# Patient Record
Sex: MALE | Race: BLACK OR AFRICAN AMERICAN | Employment: UNEMPLOYED | URBAN - METROPOLITAN AREA
[De-identification: names, ages, dates, MRNs, and addresses within clinical notes are randomized per-mention and may not be internally consistent; named-entity substitution may affect disease eponyms.]

---

## 2021-09-02 ENCOUNTER — HOSPITAL ENCOUNTER (EMERGENCY)
Age: 36
Discharge: HOME | End: 2021-09-02
Attending: STUDENT IN AN ORGANIZED HEALTH CARE EDUCATION/TRAINING PROGRAM
Payer: COMMERCIAL

## 2021-09-02 VITALS
RESPIRATION RATE: 16 BRPM | OXYGEN SATURATION: 97 % | WEIGHT: 315 LBS | HEIGHT: 73 IN | TEMPERATURE: 97.7 F | BODY MASS INDEX: 41.75 KG/M2 | HEART RATE: 97 BPM | SYSTOLIC BLOOD PRESSURE: 143 MMHG | DIASTOLIC BLOOD PRESSURE: 78 MMHG

## 2021-09-02 DIAGNOSIS — R22.0 SWELLING OF UPPER LIP: Primary | ICD-10-CM

## 2021-09-02 DIAGNOSIS — U07.1 COVID-19: ICD-10-CM

## 2021-09-02 PROCEDURE — 74011636637 HC RX REV CODE- 636/637: Performed by: PHYSICIAN ASSISTANT

## 2021-09-02 PROCEDURE — 99282 EMERGENCY DEPT VISIT SF MDM: CPT

## 2021-09-02 PROCEDURE — 74011250637 HC RX REV CODE- 250/637: Performed by: PHYSICIAN ASSISTANT

## 2021-09-02 RX ORDER — PREDNISONE 20 MG/1
60 TABLET ORAL DAILY
Qty: 12 TABLET | Refills: 0 | Status: SHIPPED | OUTPATIENT
Start: 2021-09-03 | End: 2021-09-07

## 2021-09-02 RX ORDER — HYDROXYZINE 25 MG/1
25-50 TABLET, FILM COATED ORAL
Qty: 20 TABLET | Refills: 0 | Status: SHIPPED | OUTPATIENT
Start: 2021-09-02

## 2021-09-02 RX ORDER — FAMOTIDINE 20 MG/1
20 TABLET, FILM COATED ORAL
Status: COMPLETED | OUTPATIENT
Start: 2021-09-02 | End: 2021-09-02

## 2021-09-02 RX ORDER — EPINEPHRINE 0.3 MG/.3ML
0.3 INJECTION SUBCUTANEOUS
Qty: 2 EACH | Refills: 0 | Status: SHIPPED | OUTPATIENT
Start: 2021-09-02 | End: 2021-09-02

## 2021-09-02 RX ORDER — PREDNISONE 20 MG/1
60 TABLET ORAL
Status: COMPLETED | OUTPATIENT
Start: 2021-09-02 | End: 2021-09-02

## 2021-09-02 RX ORDER — FAMOTIDINE 20 MG/1
20 TABLET, FILM COATED ORAL 2 TIMES DAILY
Qty: 14 TABLET | Refills: 0 | Status: SHIPPED | OUTPATIENT
Start: 2021-09-02

## 2021-09-02 RX ADMIN — PREDNISONE 60 MG: 20 TABLET ORAL at 13:40

## 2021-09-02 RX ADMIN — FAMOTIDINE 20 MG: 20 TABLET, FILM COATED ORAL at 13:40

## 2021-09-02 NOTE — ED PROVIDER NOTES
Patient is a 28-year-old male with past medical history significant for pruritus intermittently since Noemí morning 21, presenting with right upper lip swelling began this morning also with known COVID-19 diagnosis. He states he began with headache, loss of sense of taste and smell and rhinorrhea on Monday and tested positive for Covid yesterday. He states yesterday noticing itching in his eyes but no other symptoms. This morning he woke up with diffuse upper lip swelling and felt like his tongue was swollen\" tingling on one side\". He took hydroxyzine and later took Benadryl at 9 AM and states the swelling of his lips has gone down significantly and the symptoms within his tongue and throat have resolved. He also had itching diffusely in this arms and legs that he states is similar to previous episodes that usually improves with hydroxyzine or Benadryl. He has not seen an allergist.  He denies new soaps, detergents, foods. He does not take prescription medication regularly. Today he is taking Benadryl, hydroxyzine at 9 AM, he also had a recent procedure and was started on gabapentin and an NSAID but did not start taking after he woke up with the lip swelling. He denies fever, chills, vomiting, diarrhea, chest. He overall feels much better. No past medical history on file. Past Surgical History:   Procedure Laterality Date    FUSION/GRAFT FINGER JT      HX ORTHOPAEDIC      hand    HX TONSILLECTOMY           No family history on file.     Social History     Socioeconomic History    Marital status: SINGLE     Spouse name: Not on file    Number of children: Not on file    Years of education: Not on file    Highest education level: Not on file   Occupational History    Not on file   Tobacco Use    Smoking status: Former Smoker     Quit date: 7/15/2011     Years since quitting: 10.1   Substance and Sexual Activity    Alcohol use: Yes     Comment: rarely    Drug use: Not on file    Sexual activity: Not on file   Other Topics Concern    Not on file   Social History Narrative    Not on file     Social Determinants of Health     Financial Resource Strain:     Difficulty of Paying Living Expenses:    Food Insecurity:     Worried About Running Out of Food in the Last Year:     [address] in the Last Year:    Transportation Needs:     Lack of Transportation (Medical):  Lack of Transportation (Non-Medical):    Physical Activity:     Days of Exercise per Week:     Minutes of Exercise per Session:    Stress:     Feeling of Stress :    Social Connections:     Frequency of Communication with Friends and Family:     Frequency of Social Gatherings with Friends and Family:     Attends [employer]az[employer] Services:     Active Member of Clubs or Organizations:     Attends Club or Organization Meetings:     Marital Status:    Intimate Partner Violence:     Fear of Current or Ex-Partner:     Emotionally Abused:     Physically Abused:     Sexually Abused: ALLERGIES: Patient has no known allergies. Review of Systems   Constitutional: Negative. Negative for activity change, chills, fatigue and unexpected weight change. HENT: Positive for facial swelling. Negative for trouble swallowing. Respiratory: Negative for cough, chest tightness, shortness of breath and wheezing. Cardiovascular: Negative. Negative for chest pain and palpitations. Gastrointestinal: Negative. Negative for abdominal pain, diarrhea, nausea and vomiting. Genitourinary: Negative. Negative for dysuria, flank pain, frequency and hematuria. Musculoskeletal: Negative. Negative for arthralgias, back pain, neck pain and neck stiffness. Skin: Negative. Negative for color change and rash. Neurological: Negative. Negative for dizziness, numbness and headaches. All other systems reviewed and are negative.       Vitals:    09/02/21 1302   BP: (!) 143/78   Pulse: 97   Resp: 16   Temp: 97.7 °F (36.5 °C)   SpO2: 97%   Weight: 143.8 kg (317 lb)   Height: 6' 1\" (1.854 m)            Physical Exam  Vitals and nursing note reviewed. Constitutional:       General: He is not in acute distress. Appearance: He is well-developed. He is not toxic-appearing or diaphoretic. Comments: AA male, elevated BMI   HENT:      Head: Normocephalic and atraumatic. Mouth/Throat:      Mouth: Mucous membranes are moist.      Comments: Right upper lip with soft tissue swelling. Much improved compared to photo patient showed me that was taken this morning. No trismus or drooling. Speech normal.  Eyes:      General:         Right eye: No discharge. Left eye: No discharge. Conjunctiva/sclera: Conjunctivae normal.      Pupils: Pupils are equal, round, and reactive to light. Neck:      Trachea: No tracheal tenderness. Cardiovascular:      Rate and Rhythm: Normal rate and regular rhythm. Pulses: Normal pulses. Heart sounds: Normal heart sounds. No murmur heard. No friction rub. No gallop. Pulmonary:      Effort: Pulmonary effort is normal. No respiratory distress. Breath sounds: Normal breath sounds. No stridor. No wheezing, rhonchi or rales. Comments: No stridor. Chest:      Chest wall: No tenderness. Abdominal:      General: Bowel sounds are normal. There is no distension. Palpations: Abdomen is soft. Tenderness: There is no abdominal tenderness. There is no guarding or rebound. Musculoskeletal:         General: No tenderness. Normal range of motion. Cervical back: Full passive range of motion without pain and normal range of motion. Skin:     General: Skin is warm and dry. Capillary Refill: Capillary refill takes less than 2 seconds. Findings: No abrasion, erythema or rash. Comments: No urticaria. Neurological:      General: No focal deficit present. Mental Status: He is alert and oriented to person, place, and time.       Cranial Nerves: No cranial nerve deficit. Sensory: No sensory deficit. Coordination: Coordination normal.   Psychiatric:         Speech: Speech normal.         Behavior: Behavior normal.          MDM  Number of Diagnoses or Management Options  COVID-19  Swelling of upper lip  Diagnosis management comments:   DDx: Angioedema, allergic reaction, complication of Covid       Amount and/or Complexity of Data Reviewed  Review and summarize past medical records: yes  Discuss the patient with other providers: yes    Patient Progress  Patient progress: stable         Procedures    I discussed patient's PMH, exam findings as well as careplan with the ER attending who agrees with care plan. Claudene Primas, PA-C    DISCHARGE NOTE:  1:47 PM  The patient has been re-evaluated and feeling much better and are stable for discharge. All available radiology and laboratory results have been reviewed with patient and/or available family. Patient and/or family verbally conveyed their understanding and agreement of the patient's signs, symptoms, diagnosis, treatment and prognosis and additionally agree to follow-up as recommended in the discharge instructions or to return to the Emergency Department should their condition change or worsen prior to their follow-up appointment. All questions have been answered and patient and/or available family express understanding. MEDICATIONS GIVEN:  Medications   predniSONE (DELTASONE) tablet 60 mg (has no administration in time range)   famotidine (PEPCID) tablet 20 mg (has no administration in time range)       IMPRESSION:  1. Swelling of upper lip    2. COVID-19        PLAN:  Follow-up Information     Follow up With Specialties Details Why [address] [place] [address],[facility] Floor  Schedule an appointment as soon as possible for a visit  primary care provider for follow-up.  [place] [address]  [phone]    Linda Hall MD Allergy Schedule an appointment as soon as possible for a visit  allergist for follow-up after your COVID-19 infection has resolved. [address] [place] [address] Road  [phone]          Current Discharge Medication List      START taking these medications    Details   EPINEPHrine (EPIPEN) 0.3 mg/0.3 mL injection 0.3 mL by IntraMUSCular route once as needed for Anaphylaxis for up to 1 dose. Qty: 2 Each, Refills: 0  Start date: 9/2/2021, End date: 9/2/2021      predniSONE (DELTASONE) 20 mg tablet Take 60 mg by mouth daily for 4 days. Start on 9/03/21  Qty: 12 Tablet, Refills: 0  Start date: 9/3/2021, End date: 9/7/2021      famotidine (Pepcid) 20 mg tablet Take 1 Tablet by mouth two (2) times a day. Qty: 14 Tablet, Refills: 0  Start date: 9/2/2021      hydrOXYzine HCL (ATARAX) 25 mg tablet Take 1-2 Tablets by mouth three (3) times daily as needed for Itching.   Qty: 20 Tablet, Refills: 0  Start date: 9/2/2021

## 2021-09-02 NOTE — ED TRIAGE NOTES
Pt presents with lip swelling and rash over entire body per patients. Rash is itching. This has been intermittent for a couple months and getting worse. Pt also covid positive.

## 2021-09-02 NOTE — DISCHARGE INSTRUCTIONS
Continue Benadryl or hydroxyzine every 6 hours as needed for swelling and itching. Continue taking prednisone once daily in the morning with breakfast until gone. Continue Pepcid twice daily. Use EpiPen if you develop significant throat or tongue swelling, significant shortness of breath or other signs or symptoms of anaphylaxis and immediately come to the ER for evaluation if your symptoms worsen.

## 2021-09-03 ENCOUNTER — PATIENT OUTREACH (OUTPATIENT)
Dept: CASE MANAGEMENT | Age: 36
End: 2021-09-03

## 2021-09-03 NOTE — PROGRESS NOTES
Patient resolved from Transition of Care episode on 9/3/21. [facility] was unsuccessful at contacting this patient today. Several attempts made to contact patient at numbers provided. Patient has not had any additional ED or hospital visits. No further outreach scheduled with this CTN/ACM. Episode of Care resolved. Patient has this CTN/ACM contact information if future needs arise. [facility]    9/3/21 [facility] attempted to reach patient at number listed- not working number. [facility] contacted patient's father also emergency contact, who gave new number for patient [phone]. Left message for patient call back.  DMB

## 2021-09-03 NOTE — PROGRESS NOTES
9/3/21 Patient called ACM back and new episode of care was opened. Patient contacted regarding COVID-19 diagnosis. Discussed COVID-19 related testing which was available at this time. Test results were positive. Patient informed of results, if available? yes. Patient tested covid positive at outside facility. Ambulatory Care Manager contacted the patient by telephone to perform post discharge assessment. Call within 2 business days of discharge: Yes Verified name and  with patient as identifiers. Provided introduction to self, and explanation of the CTN/ACM role, and reason for call due to risk factors for infection and/or exposure to COVID-19. Symptoms reviewed with patient who verbalized the following symptoms: pain or aching joints, loss or taste or smell and rash, swelling lip and runny nose. Due to no new or worsening symptoms encounter was not routed to provider for escalation. Discussed follow-up appointments. If no appointment was previously scheduled, appointment scheduling offered:  No. Patient will follow up with PCP and allergist.   [facility] follow up appointment(s): No future appointments. Non-[facility] follow up appointment(s):none reported   Interventions to address risk factors: Education of patient/family/caregiver/guardian to support self-management-covid 19. Advance Care Planning:   Does patient have an Advance Directive: not on file. Educated patient about risk for severe COVID-19 due to risk factors according to CDC guidelines. ACM reviewed discharge instructions, medical action plan and red flag symptoms with the patient who verbalized understanding. Discussed COVID vaccination status: yes. Education provided on COVID-19 vaccination as appropriate. Discussed exposure protocols and quarantine with CDC Guidelines.  Patient was given an opportunity to verbalize any questions and concerns and agrees to contact ACM or health care provider for questions related to their healthcare. Reviewed and educated patient on any new and changed medications related to discharge diagnosis -instructed patient in his new medications. Patient verbalized understanding. Was patient discharged with a pulse oximeter? No    ACM provided contact information. Plan for follow-up call in 5-7 days based on severity of symptoms and risk factors.  DMB

## 2021-09-17 ENCOUNTER — PATIENT OUTREACH (OUTPATIENT)
Dept: CASE MANAGEMENT | Age: 36
End: 2021-09-17

## 2021-09-17 NOTE — PROGRESS NOTES
Patient resolved from [address] Transitions episode on 9/17/21 Discussed COVID-19 related testing which was available at this time. Test results were positive. Patient informed of results, if available? yes --Patient tested positive at outside facility. Patient/family has been provided the following resources and education related to COVID-19:                         Signs, symptoms and red flags related to COVID-19            CDC exposure and quarantine guidelines            [facility] exposure contact - [phone]            Contact for their local Department of Health                 Patient currently reports that the following symptoms have improved:  fatigue, pain or aching joints and loss or taste or smell. No further outreach scheduled with this ACM. Episode of Care resolved. Patient has this ACM contact information if future needs arise. [facility]    9/17/21 ACM attempted to contact patient at numbers provided- left message for patient call back.  DANIEL

## 2023-05-12 RX ORDER — PROMETHAZINE HYDROCHLORIDE AND CODEINE PHOSPHATE 6.25; 1 MG/5ML; MG/5ML
SYRUP ORAL 4 TIMES DAILY PRN
COMMUNITY
Start: 2013-09-17

## 2023-05-12 RX ORDER — IBUPROFEN 800 MG/1
TABLET ORAL EVERY 8 HOURS PRN
COMMUNITY
Start: 2013-12-29

## 2023-05-12 RX ORDER — FAMOTIDINE 20 MG/1
TABLET, FILM COATED ORAL 2 TIMES DAILY
COMMUNITY
Start: 2021-09-02

## 2023-05-12 RX ORDER — HYDROXYZINE HYDROCHLORIDE 25 MG/1
TABLET, FILM COATED ORAL 3 TIMES DAILY PRN
COMMUNITY
Start: 2021-09-02

## 2023-05-12 RX ORDER — NAPROXEN 375 MG/1
TABLET ORAL 2 TIMES DAILY WITH MEALS
COMMUNITY
Start: 2013-01-10

## 2023-05-12 RX ORDER — HYDROCODONE BITARTRATE AND ACETAMINOPHEN 5; 325 MG/1; MG/1
1 TABLET ORAL EVERY 4 HOURS PRN
COMMUNITY
Start: 2013-12-29